# Patient Record
Sex: FEMALE | HISPANIC OR LATINO | Employment: FULL TIME | URBAN - METROPOLITAN AREA
[De-identification: names, ages, dates, MRNs, and addresses within clinical notes are randomized per-mention and may not be internally consistent; named-entity substitution may affect disease eponyms.]

---

## 2018-10-29 ENCOUNTER — NON-PROVIDER VISIT (OUTPATIENT)
Dept: URGENT CARE | Facility: CLINIC | Age: 28
End: 2018-10-29

## 2018-10-29 ENCOUNTER — OFFICE VISIT (OUTPATIENT)
Dept: URGENT CARE | Facility: CLINIC | Age: 28
End: 2018-10-29

## 2018-10-29 DIAGNOSIS — Z01.89 RESPIRATORY CLEARANCE EXAMINATION, ENCOUNTER FOR: ICD-10-CM

## 2018-10-29 PROCEDURE — 94375 RESPIRATORY FLOW VOLUME LOOP: CPT | Performed by: NURSE PRACTITIONER

## 2018-10-29 PROCEDURE — 8916 PR CLEARANCE ONLY: Performed by: NURSE PRACTITIONER

## 2018-10-29 NOTE — PROGRESS NOTES
Radha Alberts is a 28 y.o. female here for a non-provider visit for mask fit respiraotry clearance    If abnormal was an in office provider notified today (if so, indicate provider)? Yes  Routed to PCP? No

## 2018-10-29 NOTE — ADDENDUM NOTE
Addended by: TRINIDAD, DENNIEL J. on: 10/29/2018 11:42 AM     Modules accepted: Level of Service

## 2018-10-29 NOTE — PROGRESS NOTES
Patient presents today for mask fit and respiratory clearance.  Results of respiratory survey reviewed.  Patient cleared for mask.

## 2019-09-24 ENCOUNTER — NON-PROVIDER VISIT (OUTPATIENT)
Dept: URGENT CARE | Facility: CLINIC | Age: 29
End: 2019-09-24

## 2019-09-24 ENCOUNTER — OFFICE VISIT (OUTPATIENT)
Dept: URGENT CARE | Facility: CLINIC | Age: 29
End: 2019-09-24

## 2019-09-24 DIAGNOSIS — Z01.89 RESPIRATORY CLEARANCE EXAMINATION, ENCOUNTER FOR: ICD-10-CM

## 2019-09-24 PROCEDURE — 94375 RESPIRATORY FLOW VOLUME LOOP: CPT | Mod: 29 | Performed by: NURSE PRACTITIONER

## 2019-09-24 PROCEDURE — 8916 PR CLEARANCE ONLY: Performed by: NURSE PRACTITIONER

## 2019-09-24 NOTE — PROGRESS NOTES
Radha Alberts is a 29 y.o. female here for a non-provider visit for Mask Fit    If abnormal was an in office provider notified today (if so, indicate provider)? Yes  Routed to PCP? No

## 2019-09-24 NOTE — PROGRESS NOTES
"Lung sounds clear. Mask fit satisfactory.    Refer to paper documentation scanned into electronic health record under \"Media.\"  "

## 2022-01-07 ENCOUNTER — OFFICE VISIT (OUTPATIENT)
Dept: URGENT CARE | Facility: CLINIC | Age: 32
End: 2022-01-07
Payer: COMMERCIAL

## 2022-01-07 ENCOUNTER — HOSPITAL ENCOUNTER (OUTPATIENT)
Facility: MEDICAL CENTER | Age: 32
End: 2022-01-07
Attending: PHYSICIAN ASSISTANT
Payer: COMMERCIAL

## 2022-01-07 VITALS
HEIGHT: 66 IN | BODY MASS INDEX: 24.91 KG/M2 | WEIGHT: 155 LBS | OXYGEN SATURATION: 96 % | SYSTOLIC BLOOD PRESSURE: 112 MMHG | TEMPERATURE: 98.3 F | HEART RATE: 82 BPM | DIASTOLIC BLOOD PRESSURE: 70 MMHG | RESPIRATION RATE: 14 BRPM

## 2022-01-07 DIAGNOSIS — Z20.822 EXPOSURE TO COVID-19 VIRUS: ICD-10-CM

## 2022-01-07 DIAGNOSIS — J06.9 VIRAL URI WITH COUGH: ICD-10-CM

## 2022-01-07 PROCEDURE — U0003 INFECTIOUS AGENT DETECTION BY NUCLEIC ACID (DNA OR RNA); SEVERE ACUTE RESPIRATORY SYNDROME CORONAVIRUS 2 (SARS-COV-2) (CORONAVIRUS DISEASE [COVID-19]), AMPLIFIED PROBE TECHNIQUE, MAKING USE OF HIGH THROUGHPUT TECHNOLOGIES AS DESCRIBED BY CMS-2020-01-R: HCPCS

## 2022-01-07 PROCEDURE — 99213 OFFICE O/P EST LOW 20 MIN: CPT | Mod: CS | Performed by: PHYSICIAN ASSISTANT

## 2022-01-07 PROCEDURE — U0005 INFEC AGEN DETEC AMPLI PROBE: HCPCS

## 2022-01-07 NOTE — PROGRESS NOTES
"Subjective:   Radha Alberts is a 31 y.o. female who presents for Coronavirus Screening (began monday, sore thoat, nasal congestion, home test neg, exposed )      HPI  Patient presents the clinic with COVID-19 testing.  Onset of symptoms 4 days ago with nasal congestion, sneezing followed by sore throat yesterday.  Her sore throat is mild.  Denies any painful swallowing.  She has had a mild intermittent cough. Denies any fever, chills, chest pain, SOB, abdominal pain, vomiting, diarrhea. Did not receive COVID vaccine. Positive exposure to COVID. Negative rapid covid test yesterday.       Medications:    • diphenhydrAMINE-Phenylephrine Liqd  • MUCINEX D PO  • promethazine-codeine Syrp    Allergies: Patient has no known allergies.    Problem List: Radha Alberts does not have any pertinent problems on file.    Surgical History:  No past surgical history on file.    Past Social Hx: Radha Alberts  reports that she has never smoked. She has never used smokeless tobacco. She reports that she does not use drugs.     Past Family Hx:  Radha Alberts family history includes Non-contributory in her father and mother.     Problem list, medications, and allergies reviewed by myself today in Epic.     Objective:     /70 (BP Location: Left arm, Patient Position: Sitting, BP Cuff Size: Adult)   Pulse 82   Temp 36.8 °C (98.3 °F) (Temporal)   Resp 14   Ht 1.664 m (5' 5.5\")   Wt 70.3 kg (155 lb)   SpO2 96%   BMI 25.40 kg/m²     Physical Exam  Vitals reviewed.   Constitutional:       General: She is not in acute distress.     Appearance: Normal appearance. She is not ill-appearing or toxic-appearing.   HENT:      Head: Normocephalic.      Mouth/Throat:      Mouth: Mucous membranes are moist.      Pharynx: Oropharynx is clear. Uvula midline. Posterior oropharyngeal erythema present. No pharyngeal swelling, oropharyngeal exudate or uvula swelling.      Tonsils: No tonsillar exudate or tonsillar " abscesses.   Eyes:      Conjunctiva/sclera: Conjunctivae normal.      Pupils: Pupils are equal, round, and reactive to light.   Cardiovascular:      Rate and Rhythm: Normal rate and regular rhythm.      Heart sounds: Normal heart sounds.   Pulmonary:      Effort: Pulmonary effort is normal. No respiratory distress.      Breath sounds: Normal breath sounds. No wheezing, rhonchi or rales.   Musculoskeletal:      Cervical back: Neck supple.   Lymphadenopathy:      Cervical: Cervical adenopathy present.   Skin:     General: Skin is warm and dry.   Neurological:      General: No focal deficit present.      Mental Status: She is alert and oriented to person, place, and time.   Psychiatric:         Mood and Affect: Mood normal.         Behavior: Behavior normal.         Diagnosis and associated orders:     1. Viral URI with cough  SARS-CoV-2 PCR (24 hour In-House): Collect NP swab in VTM   2. Exposure to COVID-19 virus  SARS-CoV-2 PCR (24 hour In-House): Collect NP swab in VTM        Comments/MDM:     The patient's presenting symptoms and exam findings most likely are due to a viral etiology.   Test for COVID-19 via PCR. Result will be reviewed by myself. We will call/message back for results and appropriate further instructions. Instructed to sign up for [facility] if they have not already. Result will be automatically released to [facility] application for patient review. I will be sending a message with Next Step Instructions to [facility] soon after resulted.   Symptomatic and supportive care:   Plenty of oral hydration and rest   Over the counter cough suppressant as directed.  Tylenol or ibuprofen for pain and fever as directed.   Warm salt water gargles for sore throat, soft foods, cool liquids.   Saline nasal spray and Flonase as a decongestant.   Infection control measures at home. Stay away from people, Hand washing, covering sneeze/cough, disinfect surfaces.   Remain home from work, school, and other populated  environments.   Overall, the patient is well-appearing. They are not hypoxic, afebrile, and a normal pulmonary exam.       I personally reviewed prior external notes and test results pertinent to today's visit. Red flags discussed and indications to present to the Emergency Department. Supportive care, natural history, differential diagnoses, and indications for immediate follow-up discussed. Patient expresses understanding and agrees to plan. Patient denies any other questions or concerns.     Follow-up with the primary care physician for recheck, reevaluation, and consideration of further management.    Please note that this dictation was created using voice recognition software. I have made a reasonable attempt to correct obvious errors, but I expect that there are errors of grammar and possibly content that I did not discover before finalizing the note.    This note was electronically signed by Yaya Escobar PA-C

## 2022-01-08 DIAGNOSIS — Z20.822 EXPOSURE TO COVID-19 VIRUS: ICD-10-CM

## 2022-01-08 DIAGNOSIS — J06.9 VIRAL URI WITH COUGH: ICD-10-CM

## 2022-01-08 LAB — COVID ORDER STATUS COVID19: NORMAL

## 2022-01-09 LAB
SARS-COV-2 RNA RESP QL NAA+PROBE: NOTDETECTED
SPECIMEN SOURCE: NORMAL
